# Patient Record
Sex: MALE | Race: WHITE | Employment: FULL TIME | URBAN - METROPOLITAN AREA
[De-identification: names, ages, dates, MRNs, and addresses within clinical notes are randomized per-mention and may not be internally consistent; named-entity substitution may affect disease eponyms.]

---

## 2019-08-23 ENCOUNTER — HOSPITAL ENCOUNTER (INPATIENT)
Age: 29
LOS: 1 days | Discharge: HOME | DRG: 195 | End: 2019-08-25
Attending: STUDENT IN AN ORGANIZED HEALTH CARE EDUCATION/TRAINING PROGRAM | Admitting: HOSPITALIST
Payer: COMMERCIAL

## 2019-08-23 ENCOUNTER — APPOINTMENT (OUTPATIENT)
Dept: GENERAL RADIOLOGY | Age: 29
DRG: 195 | End: 2019-08-23
Attending: STUDENT IN AN ORGANIZED HEALTH CARE EDUCATION/TRAINING PROGRAM
Payer: COMMERCIAL

## 2019-08-23 DIAGNOSIS — R09.02 HYPOXIA: ICD-10-CM

## 2019-08-23 DIAGNOSIS — J18.9 PNEUMONIA OF LEFT LOWER LOBE DUE TO INFECTIOUS ORGANISM: Primary | ICD-10-CM

## 2019-08-23 LAB
ALBUMIN SERPL-MCNC: 2.9 G/DL (ref 3.5–5)
ALBUMIN/GLOB SERPL: 0.7 {RATIO} (ref 1.1–2.2)
ALP SERPL-CCNC: 71 U/L (ref 45–117)
ALT SERPL-CCNC: 33 U/L (ref 12–78)
ANION GAP SERPL CALC-SCNC: 7 MMOL/L (ref 5–15)
APPEARANCE UR: CLEAR
AST SERPL-CCNC: 29 U/L (ref 15–37)
ATRIAL RATE: 97 BPM
BACTERIA URNS QL MICRO: NEGATIVE /HPF
BASOPHILS # BLD: 0 K/UL (ref 0–0.1)
BASOPHILS NFR BLD: 0 % (ref 0–1)
BILIRUB SERPL-MCNC: 0.4 MG/DL (ref 0.2–1)
BILIRUB UR QL: NEGATIVE
BUN SERPL-MCNC: 6 MG/DL (ref 6–20)
BUN/CREAT SERPL: 8 (ref 12–20)
CALCIUM SERPL-MCNC: 8.6 MG/DL (ref 8.5–10.1)
CALCULATED P AXIS, ECG09: 63 DEGREES
CALCULATED R AXIS, ECG10: 56 DEGREES
CALCULATED T AXIS, ECG11: 35 DEGREES
CHLORIDE SERPL-SCNC: 104 MMOL/L (ref 97–108)
CO2 SERPL-SCNC: 28 MMOL/L (ref 21–32)
COLOR UR: NORMAL
COMMENT, HOLDF: NORMAL
CREAT SERPL-MCNC: 0.72 MG/DL (ref 0.7–1.3)
DIAGNOSIS, 93000: NORMAL
DIFFERENTIAL METHOD BLD: ABNORMAL
EOSINOPHIL # BLD: 0 K/UL (ref 0–0.4)
EOSINOPHIL NFR BLD: 0 % (ref 0–7)
EPITH CASTS URNS QL MICRO: NORMAL /LPF
ERYTHROCYTE [DISTWIDTH] IN BLOOD BY AUTOMATED COUNT: 12.3 % (ref 11.5–14.5)
GLOBULIN SER CALC-MCNC: 4.3 G/DL (ref 2–4)
GLUCOSE SERPL-MCNC: 102 MG/DL (ref 65–100)
GLUCOSE UR STRIP.AUTO-MCNC: NEGATIVE MG/DL
HCT VFR BLD AUTO: 38.4 % (ref 36.6–50.3)
HGB BLD-MCNC: 12.5 G/DL (ref 12.1–17)
HGB UR QL STRIP: NEGATIVE
IMM GRANULOCYTES # BLD AUTO: 0 K/UL
IMM GRANULOCYTES NFR BLD AUTO: 0 %
KETONES UR QL STRIP.AUTO: NEGATIVE MG/DL
LACTATE BLD-SCNC: 1.34 MMOL/L (ref 0.4–2)
LEUKOCYTE ESTERASE UR QL STRIP.AUTO: NEGATIVE
LYMPHOCYTES # BLD: 1 K/UL (ref 0.8–3.5)
LYMPHOCYTES NFR BLD: 7 % (ref 12–49)
MCH RBC QN AUTO: 31.1 PG (ref 26–34)
MCHC RBC AUTO-ENTMCNC: 32.6 G/DL (ref 30–36.5)
MCV RBC AUTO: 95.5 FL (ref 80–99)
MONOCYTES # BLD: 1.1 K/UL (ref 0–1)
MONOCYTES NFR BLD: 8 % (ref 5–13)
NEUTS SEG # BLD: 12 K/UL (ref 1.8–8)
NEUTS SEG NFR BLD: 85 % (ref 32–75)
NITRITE UR QL STRIP.AUTO: NEGATIVE
NRBC # BLD: 0 K/UL (ref 0–0.01)
NRBC BLD-RTO: 0 PER 100 WBC
P-R INTERVAL, ECG05: 150 MS
PH UR STRIP: 6.5 [PH] (ref 5–8)
PLATELET # BLD AUTO: 348 K/UL (ref 150–400)
PMV BLD AUTO: 10.6 FL (ref 8.9–12.9)
POTASSIUM SERPL-SCNC: 3.5 MMOL/L (ref 3.5–5.1)
PROCALCITONIN SERPL-MCNC: 0.2 NG/ML
PROT SERPL-MCNC: 7.2 G/DL (ref 6.4–8.2)
PROT UR STRIP-MCNC: NEGATIVE MG/DL
Q-T INTERVAL, ECG07: 340 MS
QRS DURATION, ECG06: 86 MS
QTC CALCULATION (BEZET), ECG08: 431 MS
RBC # BLD AUTO: 4.02 M/UL (ref 4.1–5.7)
RBC #/AREA URNS HPF: NORMAL /HPF (ref 0–5)
RBC MORPH BLD: ABNORMAL
SAMPLES BEING HELD,HOLD: NORMAL
SODIUM SERPL-SCNC: 139 MMOL/L (ref 136–145)
SP GR UR REFRACTOMETRY: <1.005 (ref 1–1.03)
UA: UC IF INDICATED,UAUC: NORMAL
UR CULT HOLD, URHOLD: NORMAL
UROBILINOGEN UR QL STRIP.AUTO: 0.2 EU/DL (ref 0.2–1)
VENTRICULAR RATE, ECG03: 97 BPM
WBC # BLD AUTO: 14.1 K/UL (ref 4.1–11.1)
WBC URNS QL MICRO: NORMAL /HPF (ref 0–4)

## 2019-08-23 PROCEDURE — 94762 N-INVAS EAR/PLS OXIMTRY CONT: CPT

## 2019-08-23 PROCEDURE — 74011250636 HC RX REV CODE- 250/636: Performed by: FAMILY MEDICINE

## 2019-08-23 PROCEDURE — 71046 X-RAY EXAM CHEST 2 VIEWS: CPT

## 2019-08-23 PROCEDURE — 84145 PROCALCITONIN (PCT): CPT

## 2019-08-23 PROCEDURE — 94640 AIRWAY INHALATION TREATMENT: CPT

## 2019-08-23 PROCEDURE — 99218 HC RM OBSERVATION: CPT

## 2019-08-23 PROCEDURE — 81001 URINALYSIS AUTO W/SCOPE: CPT

## 2019-08-23 PROCEDURE — 36415 COLL VENOUS BLD VENIPUNCTURE: CPT

## 2019-08-23 PROCEDURE — 74011250637 HC RX REV CODE- 250/637: Performed by: FAMILY MEDICINE

## 2019-08-23 PROCEDURE — 85025 COMPLETE CBC W/AUTO DIFF WBC: CPT

## 2019-08-23 PROCEDURE — 87040 BLOOD CULTURE FOR BACTERIA: CPT

## 2019-08-23 PROCEDURE — 77030029684 HC NEB SM VOL KT MONA -A

## 2019-08-23 PROCEDURE — 74011000250 HC RX REV CODE- 250: Performed by: FAMILY MEDICINE

## 2019-08-23 PROCEDURE — 74011250636 HC RX REV CODE- 250/636: Performed by: STUDENT IN AN ORGANIZED HEALTH CARE EDUCATION/TRAINING PROGRAM

## 2019-08-23 PROCEDURE — 80053 COMPREHEN METABOLIC PANEL: CPT

## 2019-08-23 PROCEDURE — 83605 ASSAY OF LACTIC ACID: CPT

## 2019-08-23 PROCEDURE — 77010033678 HC OXYGEN DAILY

## 2019-08-23 PROCEDURE — 74011000258 HC RX REV CODE- 258: Performed by: STUDENT IN AN ORGANIZED HEALTH CARE EDUCATION/TRAINING PROGRAM

## 2019-08-23 PROCEDURE — 93005 ELECTROCARDIOGRAM TRACING: CPT

## 2019-08-23 PROCEDURE — 99285 EMERGENCY DEPT VISIT HI MDM: CPT

## 2019-08-23 PROCEDURE — 94760 N-INVAS EAR/PLS OXIMETRY 1: CPT

## 2019-08-23 RX ORDER — SODIUM CHLORIDE 0.9 % (FLUSH) 0.9 %
5-40 SYRINGE (ML) INJECTION AS NEEDED
Status: DISCONTINUED | OUTPATIENT
Start: 2019-08-23 | End: 2019-08-25 | Stop reason: HOSPADM

## 2019-08-23 RX ORDER — SODIUM CHLORIDE 9 MG/ML
75 INJECTION, SOLUTION INTRAVENOUS CONTINUOUS
Status: DISCONTINUED | OUTPATIENT
Start: 2019-08-23 | End: 2019-08-25

## 2019-08-23 RX ORDER — IPRATROPIUM BROMIDE AND ALBUTEROL SULFATE 2.5; .5 MG/3ML; MG/3ML
3 SOLUTION RESPIRATORY (INHALATION)
Status: DISCONTINUED | OUTPATIENT
Start: 2019-08-23 | End: 2019-08-24

## 2019-08-23 RX ORDER — ACETAMINOPHEN 325 MG/1
650 TABLET ORAL
Status: DISCONTINUED | OUTPATIENT
Start: 2019-08-23 | End: 2019-08-25 | Stop reason: HOSPADM

## 2019-08-23 RX ORDER — SODIUM CHLORIDE 0.9 % (FLUSH) 0.9 %
5-10 SYRINGE (ML) INJECTION AS NEEDED
Status: DISCONTINUED | OUTPATIENT
Start: 2019-08-23 | End: 2019-08-25 | Stop reason: HOSPADM

## 2019-08-23 RX ORDER — BENZONATATE 100 MG/1
100 CAPSULE ORAL
Status: DISCONTINUED | OUTPATIENT
Start: 2019-08-23 | End: 2019-08-25 | Stop reason: HOSPADM

## 2019-08-23 RX ORDER — SODIUM CHLORIDE 0.9 % (FLUSH) 0.9 %
5-40 SYRINGE (ML) INJECTION EVERY 8 HOURS
Status: DISCONTINUED | OUTPATIENT
Start: 2019-08-23 | End: 2019-08-25 | Stop reason: HOSPADM

## 2019-08-23 RX ORDER — IBUPROFEN 600 MG/1
600 TABLET ORAL
COMMUNITY
End: 2019-08-25

## 2019-08-23 RX ADMIN — IPRATROPIUM BROMIDE AND ALBUTEROL SULFATE 3 ML: .5; 3 SOLUTION RESPIRATORY (INHALATION) at 17:35

## 2019-08-23 RX ADMIN — BENZONATATE 100 MG: 100 CAPSULE ORAL at 19:22

## 2019-08-23 RX ADMIN — SODIUM CHLORIDE 1000 ML: 900 INJECTION, SOLUTION INTRAVENOUS at 15:17

## 2019-08-23 RX ADMIN — Medication 10 ML: at 22:47

## 2019-08-23 RX ADMIN — ACETAMINOPHEN 650 MG: 325 TABLET, FILM COATED ORAL at 19:24

## 2019-08-23 RX ADMIN — SODIUM CHLORIDE 1000 ML: 900 INJECTION, SOLUTION INTRAVENOUS at 14:05

## 2019-08-23 RX ADMIN — SODIUM CHLORIDE 400 ML: 900 INJECTION, SOLUTION INTRAVENOUS at 13:30

## 2019-08-23 RX ADMIN — AZITHROMYCIN MONOHYDRATE 500 MG: 500 INJECTION, POWDER, LYOPHILIZED, FOR SOLUTION INTRAVENOUS at 15:17

## 2019-08-23 RX ADMIN — CEFTRIAXONE SODIUM 2 G: 2 INJECTION, POWDER, FOR SOLUTION INTRAMUSCULAR; INTRAVENOUS at 14:38

## 2019-08-23 RX ADMIN — IPRATROPIUM BROMIDE AND ALBUTEROL SULFATE 3 ML: .5; 3 SOLUTION RESPIRATORY (INHALATION) at 19:27

## 2019-08-23 RX ADMIN — SODIUM CHLORIDE 75 ML/HR: 900 INJECTION, SOLUTION INTRAVENOUS at 17:29

## 2019-08-23 NOTE — PROGRESS NOTES
TRANSFER - IN REPORT:    Verbal report received from [facility]) on Michell Odor  being received from ED (unit) for routine progression of care      Report consisted of patients Situation, Background, Assessment and   Recommendations(SBAR). Information from the following report(s) SBAR, Kardex, ED Summary, Intake/Output, MAR and Recent Results was reviewed with the receiving nurse. Opportunity for questions and clarification was provided. Rossana May to receive care of patient on admission to unit.  The above SBAR report was reviewed with Carlita Bryant

## 2019-08-23 NOTE — LETTER
NOTIFICATION OF RETURN TO WORK / SCHOOL 
 
8/25/2019 Mr. Pojoa Metzger [address] [id number] To Whom It May Concern: 
 
Pooja Metzger was admitted to [facility] from 8/23/19 to 8/25/19. He will return to work on 8/28/19 with no restrictions. If there are questions or concerns please have the patient contact our office. Sincerely, Diane Hodges MD

## 2019-08-23 NOTE — ED PROVIDER NOTES
Previously healthy 26-year-old male presenting with fever for the past 9 days, frequent nonproductive cough. Over the past several days he is developed shortness of breath especially with exertion. Denies any recent distant travel, hemoptysis. No leg swelling. [facility], reportedly 85% on room air. Here he is satting 88-90 on room air. Improved with 2 L nasal cannula. Mild sore throat associated with frequent coughing, denies chest pain. History reviewed. No pertinent past medical history. Past Surgical History:   Procedure Laterality Date    HX HEENT      wisdom teeth extraction         History reviewed. No pertinent family history.     Social History     Socioeconomic History    Marital status: SINGLE     Spouse name: Not on file    Number of children: Not on file    Years of education: Not on file    Highest education level: Not on file   Occupational History    Not on file   Social Needs    Financial resource strain: Not on file    Food insecurity:     Worry: Not on file     Inability: Not on file    Transportation needs:     Medical: Not on file     Non-medical: Not on file   Tobacco Use    Smoking status: Never Smoker    Smokeless tobacco: Never Used   Substance and Sexual Activity    Alcohol use: Yes     Comment: socially    Drug use: Not on file    Sexual activity: Not on file   Lifestyle    Physical activity:     Days per week: Not on file     Minutes per session: Not on file    Stress: Not on file   Relationships    Social connections:     Talks on phone: Not on file     Gets together: Not on file     Attends Yazidi service: Not on file     Active member of club or organization: Not on file     Attends meetings of clubs or organizations: Not on file     Relationship status: Not on file    Intimate partner violence:     Fear of current or ex partner: Not on file     Emotionally abused: Not on file     Physically abused: Not on file     Forced sexual activity: Not on file   Other Topics Concern    Not on file   Social History Narrative    Not on file         ALLERGIES: Patient has no known allergies. Review of Systems   Constitutional: Negative for chills, fatigue and fever. HENT: Negative for ear pain, sore throat and trouble swallowing. Eyes: Negative for visual disturbance. Respiratory: Positive for cough and shortness of breath. Cardiovascular: Negative for chest pain and leg swelling. Gastrointestinal: Negative for abdominal pain. Genitourinary: Negative for dysuria. Musculoskeletal: Negative for back pain. Skin: Negative for rash. Neurological: Negative for light-headedness and headaches. Psychiatric/Behavioral: Negative for confusion. All other systems reviewed and are negative. Vitals:    08/23/19 1308 08/23/19 1318 08/23/19 1330 08/23/19 1345   BP: 154/76  145/83 142/66   Pulse:   (!) 111 (!) 103   Resp: 12  23 22   Temp: 98.9 °F (37.2 °C)      SpO2: (!) 83% 94% 97% 94%            Physical Exam   Constitutional: He appears well-developed. No distress. HENT:   Head: Normocephalic and atraumatic. Right Ear: External ear normal.   Left Ear: External ear normal.   Eyes: Pupils are equal, round, and reactive to light. EOM are normal.   Neck: Normal range of motion. Cardiovascular:   Tachycardic     Pulmonary/Chest: Effort normal. He has rales (lll). He exhibits no tenderness. Abdominal: Soft. He exhibits no distension and no mass. There is no tenderness. There is no guarding. Neurological: He is alert. He has normal strength. No sensory deficit. Skin: Capillary refill takes less than 2 seconds. Nursing note and vitals reviewed. EKG: Normal sinus rhythm rate of 97, normal axis, no ST-T wave changes. Normal intervals. QRS, QTc within normal limits.   MDM  Number of Diagnoses or Management Options  Diagnosis management comments: Patient with fever now for 9 days, initially diagnosed with viral infection, now hypoxemic. Consider pneumonia versus less likely viral pneumonitis, heart failure, pleural effusion, pulmonary embolus. Check x-ray, IV fluids, antibiotics depending on x-ray results, likely admission given hypoxemia. Hospitalist Cinthia for Admission  2:08 PM    ED Room Number: ER23/23  Patient Name and age:  Lemon Holstein 26 y.o.  male  Working Diagnosis:   1. Pneumonia of left lower lobe due to infectious organism ([facility] [address]    2.  Hypoxia      Readmission: no  Isolation Requirements:  no  Recommended Level of Care:  med/surg  Code Status:  Full Code  Department:[facility] Adult ED - 21   Other:           Procedures

## 2019-08-23 NOTE — ED TRIAGE NOTES
Pt visited [facility] today x 1 week ago, diagnosed with URI, was not given abx. Pt states he was sob x2-3 days, O2 sat 85% RA, denies CP. Pt a&ox3, pt at 94% on 4 L via NC, pt now 91-90% RA. Pt with nonproductive cough.

## 2019-08-23 NOTE — H&P
[facility]  HISTORY AND PHYSICAL    Name:  Faiza Duval  MR#:  [id number]  :  1990  ACCOUNT #:  [de-identified]  ADMIT DATE:  2019    CHIEF COMPLAINT:  Fever. HISTORY OF PRESENT ILLNESS:  The patient is a 23-year-old gentleman with no past medical history, presents to the hospital with 9 days of fever and nonproductive cough. The patient reports about 9 days ago, he started having some cough associated with shortness of breath. The patient reports he went to [facility].  He was given some ibuprofen and was told this was secondary to \"a virus. \"  The patient reports he continued to have shortness of breath, cough, and today he started having significant shortness of breath. The patient reports he went to the ER today, found to be hypoxic. His pulse ox was found to be 83% on room air and he was sent to the ER for further management and evaluation. The patient came to the ER, was found to have a pulse ox around 88% and was put on 2 L nasal cannula. He had a chest x-ray done and was found to have pneumonia and it was multilobar pneumonia and was requested to be admitted under the hospitalist service. The patient denies any headache, blurry vision, sore throat, trouble swallowing, trouble with speech, chest pain, abdominal pain, constipation, diarrhea, urinary symptoms, focal or generalized neurological weakness, recent travel, sick contacts, falls, injuries, hematemesis, melena, hemoptysis, hematuria, or any other concerns or problems. PAST MEDICAL HISTORY:  See above. HOME MEDICATIONS:  None. SOCIAL HISTORY:  Denies tobacco abuse. Drinks 6 to 8 beers over the weekend. Denies IV drug abuse. Lives at home. ALLERGIES:  NO KNOWN DRUG ALLERGIES. REVIEW OF SYMPTOMS:  All systems were reviewed and found to be essentially negative except for the symptoms mentioned above.     FAMILY HISTORY:  Discussed and was found to be noncontributory to the present admission, specifically no history of respiratory illness within the family. PHYSICAL EXAMINATION:  VITAL SIGNS:  Temperature 99.5, pulse 100, respiratory rate 18, blood pressure 137/72, pulse ox 94% on 2 L, on arrival it was 83% on room air. GENERAL:  Alert x3, awake, moderately distressed, pleasant male, appears to be stated age. HEENT:  Pupils are equal and reactive to light. Dry mucous membranes. Tympanic membranes clear. NECK:  Supple. CHEST:  Decreased basilar breath sounds with scattered crepitations. CORONARY:  S1 and S2 are heard. ABDOMEN:  Soft, nontender, and nondistended. Bowel sounds are physiological.  EXTREMITIES:  No clubbing, no cyanosis, no edema. NEURO/PSYCH:  Pleasant mood and affect. Cranial nerves II through XII are grossly intact. No focal neurological deficits. SKIN:  Warm. LABORATORY DATA:  White count 14.1, hemoglobin 12.5, hematocrit 38.4, platelets 464. Urine shows no signs of infection. Sodium 139, potassium 3.5, chloride 104, bicarbonate 28, anion gap 7, glucose 102, BUN 6, creatinine 0.72, calcium 8.6. Bilirubin total 0.4, ALT 33, AST 29, alkaline phosphatase 71. Procalcitonin is 0.2. X-ray of the chest shows bilateral multifocal pneumonia. EKG shows normal sinus rhythm. ASSESSMENT AND PLAN:  1.  Multifocal pneumonia with acute hypoxic respiratory failure. The patient will be admitted on a telemetry bed. We will provide IV hydration, IV antibiotics, DuoNebs, supportive care, close monitoring, oxygen support, pulse ox monitoring, and reassess as needed. We will continue to closely monitor. Blood cultures will be drawn. Monitor for now. 2.  Gastrointestinal/deep venous thrombosis prophylaxis. The patient will be on sequential compression devices.       Monica Watts MD [email or web address]  D:  08/23/2019 15:35  T:  08/23/2019 16:21  JOB #:  [id number]

## 2019-08-23 NOTE — PROGRESS NOTES
Admission Medication Reconciliation:    Information obtained from:  Patient  RxQuery data available¹:  NO    Comments/Recommendations: Updated PTA meds/reviewed patient's allergies. 1)  Patient states he has been taking 2-3 tabs of ibuprofen 600mg per day to relieve his symptoms. Patient denies taking any antibiotics. 2)  Medication changes (since last review): Added  - Ibuprofen 600mg 1tab po 4 times daily as needed     ¹RxQuery pharmacy benefit data reflects medications filled and processed through the patient's insurance, however   this data does NOT capture whether the medication was picked up or is currently being taken by the patient. Allergies:  Patient has no known allergies. Significant PMH/Disease States: History reviewed. No pertinent past medical history. Chief Complaint for this Admission:  No chief complaint on file. Prior to Admission Medications:   Prior to Admission Medications   Prescriptions Last Dose Informant Patient Reported? Taking?   ibuprofen (MOTRIN) 600 mg tablet   Yes Yes   Sig: Take 600 mg by mouth every six (6) hours as needed for Pain.       Facility-Administered Medications: None

## 2019-08-23 NOTE — ROUTINE PROCESS
TRANSFER - OUT REPORT:    Verbal report given to Pamela Deleon RN(name) on [employer]  being transferred to [facility](unit) for routine progression of care       Report consisted of patients Situation, Background, Assessment and   Recommendations(SBAR). Information from the following report(s) SBAR was reviewed with the receiving nurse. Lines:   Peripheral IV 08/23/19 Right Antecubital (Active)   Site Assessment Clean, dry, & intact 8/23/2019  1:00 PM   Phlebitis Assessment 0 8/23/2019  1:00 PM   Infiltration Assessment 0 8/23/2019  1:00 PM   Dressing Status Clean, dry, & intact 8/23/2019  1:00 PM       Peripheral IV 08/23/19 Right Wrist (Active)   Site Assessment Clean, dry, & intact 8/23/2019  1:18 PM   Phlebitis Assessment 0 8/23/2019  1:18 PM   Infiltration Assessment 0 8/23/2019  1:18 PM   Dressing Status Clean, dry, & intact 8/23/2019  1:18 PM   Dressing Type Transparent 8/23/2019  1:18 PM   Alcohol Cap Used No 8/23/2019  1:18 PM        Opportunity for questions and clarification was provided.

## 2019-08-24 LAB
ANION GAP SERPL CALC-SCNC: 8 MMOL/L (ref 5–15)
BASOPHILS # BLD: 0 K/UL (ref 0–0.1)
BASOPHILS NFR BLD: 0 % (ref 0–1)
BUN SERPL-MCNC: 4 MG/DL (ref 6–20)
BUN/CREAT SERPL: 6 (ref 12–20)
CALCIUM SERPL-MCNC: 8 MG/DL (ref 8.5–10.1)
CHLORIDE SERPL-SCNC: 106 MMOL/L (ref 97–108)
CO2 SERPL-SCNC: 25 MMOL/L (ref 21–32)
CREAT SERPL-MCNC: 0.7 MG/DL (ref 0.7–1.3)
DIFFERENTIAL METHOD BLD: ABNORMAL
EOSINOPHIL # BLD: 0.1 K/UL (ref 0–0.4)
EOSINOPHIL NFR BLD: 1 % (ref 0–7)
ERYTHROCYTE [DISTWIDTH] IN BLOOD BY AUTOMATED COUNT: 12.6 % (ref 11.5–14.5)
GLUCOSE SERPL-MCNC: 110 MG/DL (ref 65–100)
HCT VFR BLD AUTO: 34.3 % (ref 36.6–50.3)
HGB BLD-MCNC: 11.5 G/DL (ref 12.1–17)
IMM GRANULOCYTES # BLD AUTO: 0.2 K/UL (ref 0–0.04)
IMM GRANULOCYTES NFR BLD AUTO: 1 % (ref 0–0.5)
LYMPHOCYTES # BLD: 1.1 K/UL (ref 0.8–3.5)
LYMPHOCYTES NFR BLD: 8 % (ref 12–49)
MCH RBC QN AUTO: 31.9 PG (ref 26–34)
MCHC RBC AUTO-ENTMCNC: 33.5 G/DL (ref 30–36.5)
MCV RBC AUTO: 95.3 FL (ref 80–99)
MONOCYTES # BLD: 1.4 K/UL (ref 0–1)
MONOCYTES NFR BLD: 10 % (ref 5–13)
NEUTS SEG # BLD: 11.2 K/UL (ref 1.8–8)
NEUTS SEG NFR BLD: 80 % (ref 32–75)
NRBC # BLD: 0 K/UL (ref 0–0.01)
NRBC BLD-RTO: 0 PER 100 WBC
PLATELET # BLD AUTO: 389 K/UL (ref 150–400)
PMV BLD AUTO: 10.1 FL (ref 8.9–12.9)
POTASSIUM SERPL-SCNC: 3.6 MMOL/L (ref 3.5–5.1)
RBC # BLD AUTO: 3.6 M/UL (ref 4.1–5.7)
SODIUM SERPL-SCNC: 139 MMOL/L (ref 136–145)
WBC # BLD AUTO: 14 K/UL (ref 4.1–11.1)

## 2019-08-24 PROCEDURE — 85025 COMPLETE CBC W/AUTO DIFF WBC: CPT

## 2019-08-24 PROCEDURE — 87449 NOS EACH ORGANISM AG IA: CPT

## 2019-08-24 PROCEDURE — 87070 CULTURE OTHR SPECIMN AEROBIC: CPT

## 2019-08-24 PROCEDURE — 36415 COLL VENOUS BLD VENIPUNCTURE: CPT

## 2019-08-24 PROCEDURE — 80048 BASIC METABOLIC PNL TOTAL CA: CPT

## 2019-08-24 PROCEDURE — 74011000258 HC RX REV CODE- 258: Performed by: FAMILY MEDICINE

## 2019-08-24 PROCEDURE — 74011250636 HC RX REV CODE- 250/636: Performed by: FAMILY MEDICINE

## 2019-08-24 PROCEDURE — 65660000000 HC RM CCU STEPDOWN

## 2019-08-24 PROCEDURE — 99218 HC RM OBSERVATION: CPT

## 2019-08-24 PROCEDURE — 74011250637 HC RX REV CODE- 250/637: Performed by: FAMILY MEDICINE

## 2019-08-24 RX ORDER — IPRATROPIUM BROMIDE AND ALBUTEROL SULFATE 2.5; .5 MG/3ML; MG/3ML
3 SOLUTION RESPIRATORY (INHALATION)
Status: DISCONTINUED | OUTPATIENT
Start: 2019-08-24 | End: 2019-08-25 | Stop reason: HOSPADM

## 2019-08-24 RX ADMIN — BENZONATATE 100 MG: 100 CAPSULE ORAL at 04:35

## 2019-08-24 RX ADMIN — Medication 10 ML: at 21:57

## 2019-08-24 RX ADMIN — ACETAMINOPHEN 650 MG: 325 TABLET, FILM COATED ORAL at 04:35

## 2019-08-24 RX ADMIN — ACETAMINOPHEN 650 MG: 325 TABLET, FILM COATED ORAL at 21:55

## 2019-08-24 RX ADMIN — BENZONATATE 100 MG: 100 CAPSULE ORAL at 21:55

## 2019-08-24 RX ADMIN — BENZONATATE 100 MG: 100 CAPSULE ORAL at 11:30

## 2019-08-24 RX ADMIN — CEFTRIAXONE SODIUM 1 G: 1 INJECTION, POWDER, FOR SOLUTION INTRAMUSCULAR; INTRAVENOUS at 14:06

## 2019-08-24 RX ADMIN — SODIUM CHLORIDE 75 ML/HR: 900 INJECTION, SOLUTION INTRAVENOUS at 22:50

## 2019-08-24 RX ADMIN — AZITHROMYCIN MONOHYDRATE 500 MG: 500 INJECTION, POWDER, LYOPHILIZED, FOR SOLUTION INTRAVENOUS at 15:48

## 2019-08-24 RX ADMIN — SODIUM CHLORIDE 75 ML/HR: 900 INJECTION, SOLUTION INTRAVENOUS at 10:31

## 2019-08-24 NOTE — ROUTINE PROCESS
Bedside shift change report given to Marlys Li RN (oncoming nurse) by Orestes Russo RN (offgoing nurse). Report included the following information SBAR, Kardex, Intake/Output, MAR and Cardiac Rhythm Sinus Tach and NSR.

## 2019-08-24 NOTE — PHYSICIAN ADVISORY
Letter of Status Determination:   Recommend hospitalization status upgraded from   OBSERVATION  to INPATIENT  Status     Pt Name:  Pooja Metzger   MR#   [address] # [id number] /  [id number]  Payor: Falko Toribio / Plan: Ian Kotyk / Product Type: HMO /    CSN#  [id number]   [address] [place] [address]  359/01  @ [facility]   Hospitalization date  8/23/2019 12:54 PM   Current Attending Physician  Blu Mendez MD   Principal diagnosis  Pneumonia [J18.9]     Clinicals  26 y.o. y.o  male hospitalized with above diagnosis  Multilobar pneumonia with hypoxia      [place] (MCG) criteria   Does  apply Pneumonia with hypoxia, bliateral pneumonai  Wbc 14   STATUS DETERMINATION  Inpatient       Additional comments     Payor: Flako Toribio / Plan: Ian Kotyk / Product Type: HMO /           Karena Echols MD

## 2019-08-24 NOTE — PROGRESS NOTES
Hospitalist Progress Note  Charity Skaggs MD  Answering service: [phone] from in house phone  Cell:       Date of Service:  2019  NAME:  Abena Samuel  :  1990  MRN:  [id number]      Admission Summary:   25-year-old gentleman with no past medical history, presents to the hospital with 9 days of fever and nonproductive cough. The patient reports about 9 days ago, he started having some cough associated with shortness of breath. The patient reports he went to [facility].  He was given some ibuprofen and was told this was secondary to \"a virus. \"  The patient reports he continued to have shortness of breath, cough, and today he started having significant shortness of breath. The patient reports he went to the ER today, found to be hypoxic. His pulse ox was found to be 83% on room air and he was sent to the ER for further management and evaluation. The patient came to the ER, was found to have a pulse ox around 88% and was put on 2 L nasal cannula    Interval history / Subjective:     Feels some what better. Assessment & Plan:     1. Multilobar pneumonia: Check Legionella and strep pneumo agen. On Rocephin and Zithromax. Sputum ordered. Blood cultures negative so far. 2. Hypoxia related to Multiolobar pneumonia. Code status: Full  DVT prophylaxis: Lovenox    Care Plan discussed with: Patient/Family  Disposition: Home w/Family and TBD     Hospital Problems  Never Reviewed          Codes Class Noted POA    Pneumonia ICD-10-CM: J18.9  ICD-9-CM: 862  2019 Unknown                Review of Systems:   A comprehensive review of systems was negative except for that written in the HPI. Vital Signs:    Last 24hrs VS reviewed since prior progress note.  Most recent are:  Visit Vitals  /80 (BP 1 Location: Left arm, BP Patient Position: At rest)   Pulse 94   Temp 97.6 °F (36.4 °C)   Resp 20   Wt 80 kg (176 lb 5.9 oz) SpO2 94%         Intake/Output Summary (Last 24 hours) at 8/24/2019 0926  Last data filed at 8/24/2019 2153  Gross per 24 hour   Intake 1285 ml   Output --   Net 1285 ml        Physical Examination:             Constitutional:  No acute distress, cooperative, pleasant    ENT:  Oral mucous moist, oropharynx benign. Neck supple,    Resp:  CTA bilaterally. No wheezing/rhonchi/rales. No accessory muscle use   CV:  Regular rhythm, normal rate, no murmurs, gallops, rubs    GI:  Soft, non distended, non tender. normoactive bowel sounds, no hepatosplenomegaly     Musculoskeletal:  No edema, warm, 2+ pulses throughout    Neurologic:  Moves all extremities. AAOx3, CN II-XII reviewed     Skin:  Good turgor, no rashes or ulcers       Data Review:    Review and/or order of clinical lab test      Labs:     Recent Labs     08/24/19  0358 08/23/19  1319   WBC 14.0* 14.1*   HGB 11.5* 12.5   HCT 34.3* 38.4    348     Recent Labs     08/24/19  0358 08/23/19  1319    139   K 3.6 3.5    104   CO2 25 28   BUN 4* 6   CREA 0.70 0.72   * 102*   CA 8.0* 8.6     Recent Labs     08/23/19  1319   SGOT 29   ALT 33   AP 71   TBILI 0.4   TP 7.2   ALB 2.9*   GLOB 4.3*     No results for input(s): INR, PTP, APTT in the last 72 hours. No lab exists for component: INREXT   No results for input(s): FE, TIBC, PSAT, FERR in the last 72 hours. No results found for: FOL, RBCF   No results for input(s): PH, PCO2, PO2 in the last 72 hours. No results for input(s): CPK, CKNDX, TROIQ in the last 72 hours.     No lab exists for component: CPKMB  No results found for: CHOL, CHOLX, CHLST, CHOLV, HDL, LDL, LDLC, DLDLP, TGLX, TRIGL, TRIGP, CHHD, CHHDX  No results found for: [facility]  Lab Results   Component Value Date/Time    Color YELLOW/STRAW 08/23/2019 01:43 PM    Appearance CLEAR 08/23/2019 01:43 PM    Specific gravity <1.005 08/23/2019 01:43 PM    pH (UA) 6.5 08/23/2019 01:43 PM    Protein NEGATIVE  08/23/2019 01:43 PM Glucose NEGATIVE  08/23/2019 01:43 PM    Ketone NEGATIVE  08/23/2019 01:43 PM    Bilirubin NEGATIVE  08/23/2019 01:43 PM    Urobilinogen 0.2 08/23/2019 01:43 PM    Nitrites NEGATIVE  08/23/2019 01:43 PM    Leukocyte Esterase NEGATIVE  08/23/2019 01:43 PM    Epithelial cells FEW 08/23/2019 01:43 PM    Bacteria NEGATIVE  08/23/2019 01:43 PM    WBC 0-4 08/23/2019 01:43 PM    RBC 0-5 08/23/2019 01:43 PM         Medications Reviewed:     Current Facility-Administered Medications   Medication Dose Route Frequency    sodium chloride (NS) flush 5-10 mL  5-10 mL IntraVENous PRN    sodium chloride (NS) flush 5-40 mL  5-40 mL IntraVENous Q8H    sodium chloride (NS) flush 5-40 mL  5-40 mL IntraVENous PRN    0.9% sodium chloride infusion  75 mL/hr IntraVENous CONTINUOUS    cefTRIAXone (ROCEPHIN) 1 g in 0.9% sodium chloride (MBP/ADV) 50 mL  1 g IntraVENous Q24H    azithromycin (ZITHROMAX) 500 mg in 0.9% sodium chloride (MBP/ADV) 250 mL  500 mg IntraVENous Q24H    acetaminophen (TYLENOL) tablet 650 mg  650 mg Oral Q4H PRN    albuterol-ipratropium (DUO-NEB) 2.5 MG-0.5 MG/3 ML  3 mL Nebulization Q4H RT    benzonatate (TESSALON) capsule 100 mg  100 mg Oral TID PRN     ______________________________________________________________________  EXPECTED LENGTH OF STAY: - - -  ACTUAL LENGTH OF STAY:          0                 Yvonne Kirk MD

## 2019-08-25 VITALS
SYSTOLIC BLOOD PRESSURE: 134 MMHG | WEIGHT: 174.16 LBS | DIASTOLIC BLOOD PRESSURE: 79 MMHG | HEART RATE: 77 BPM | RESPIRATION RATE: 20 BRPM | TEMPERATURE: 97.9 F | OXYGEN SATURATION: 96 %

## 2019-08-25 LAB
ANION GAP SERPL CALC-SCNC: 6 MMOL/L (ref 5–15)
BASOPHILS # BLD: 0 K/UL (ref 0–0.1)
BASOPHILS NFR BLD: 0 % (ref 0–1)
BLASTS NFR BLD MANUAL: 0 %
BUN SERPL-MCNC: 5 MG/DL (ref 6–20)
BUN/CREAT SERPL: 9 (ref 12–20)
CALCIUM SERPL-MCNC: 8.6 MG/DL (ref 8.5–10.1)
CHLORIDE SERPL-SCNC: 108 MMOL/L (ref 97–108)
CO2 SERPL-SCNC: 26 MMOL/L (ref 21–32)
CREAT SERPL-MCNC: 0.58 MG/DL (ref 0.7–1.3)
DIFFERENTIAL METHOD BLD: ABNORMAL
EOSINOPHIL # BLD: 0.3 K/UL (ref 0–0.4)
EOSINOPHIL NFR BLD: 3 % (ref 0–7)
ERYTHROCYTE [DISTWIDTH] IN BLOOD BY AUTOMATED COUNT: 13 % (ref 11.5–14.5)
GLUCOSE SERPL-MCNC: 87 MG/DL (ref 65–100)
HCT VFR BLD AUTO: 34.4 % (ref 36.6–50.3)
HGB BLD-MCNC: 11.9 G/DL (ref 12.1–17)
IMM GRANULOCYTES # BLD AUTO: 0 K/UL
IMM GRANULOCYTES NFR BLD AUTO: 0 %
L PNEUMO1 AG UR QL IA: NEGATIVE
LYMPHOCYTES # BLD: 1 K/UL (ref 0.8–3.5)
LYMPHOCYTES NFR BLD: 10 % (ref 12–49)
MCH RBC QN AUTO: 34.5 PG (ref 26–34)
MCHC RBC AUTO-ENTMCNC: 34.6 G/DL (ref 30–36.5)
MCV RBC AUTO: 99.7 FL (ref 80–99)
METAMYELOCYTES NFR BLD MANUAL: 0 %
MONOCYTES # BLD: 1.1 K/UL (ref 0–1)
MONOCYTES NFR BLD: 11 % (ref 5–13)
MYELOCYTES NFR BLD MANUAL: 0 %
NEUTS BAND NFR BLD MANUAL: 1 % (ref 0–6)
NEUTS SEG # BLD: 7.8 K/UL (ref 1.8–8)
NEUTS SEG NFR BLD: 75 % (ref 32–75)
NRBC # BLD: 0 K/UL (ref 0–0.01)
NRBC BLD-RTO: 0 PER 100 WBC
OTHER CELLS NFR BLD MANUAL: 0 %
PLATELET # BLD AUTO: 416 K/UL (ref 150–400)
PMV BLD AUTO: 10 FL (ref 8.9–12.9)
POTASSIUM SERPL-SCNC: 3.9 MMOL/L (ref 3.5–5.1)
PROMYELOCYTES NFR BLD MANUAL: 0 %
RBC # BLD AUTO: 3.45 M/UL (ref 4.1–5.7)
RBC MORPH BLD: ABNORMAL
SODIUM SERPL-SCNC: 140 MMOL/L (ref 136–145)
SPECIMEN SOURCE: NORMAL
WBC # BLD AUTO: 10.2 K/UL (ref 4.1–11.1)

## 2019-08-25 PROCEDURE — 36415 COLL VENOUS BLD VENIPUNCTURE: CPT

## 2019-08-25 PROCEDURE — 85027 COMPLETE CBC AUTOMATED: CPT

## 2019-08-25 PROCEDURE — 80048 BASIC METABOLIC PNL TOTAL CA: CPT

## 2019-08-25 PROCEDURE — 94760 N-INVAS EAR/PLS OXIMETRY 1: CPT

## 2019-08-25 PROCEDURE — 74011250637 HC RX REV CODE- 250/637: Performed by: FAMILY MEDICINE

## 2019-08-25 PROCEDURE — 77010033678 HC OXYGEN DAILY

## 2019-08-25 RX ORDER — CEFDINIR 300 MG/1
300 CAPSULE ORAL 2 TIMES DAILY
Qty: 8 CAP | Refills: 0 | Status: SHIPPED | OUTPATIENT
Start: 2019-08-25 | End: 2021-05-19

## 2019-08-25 RX ADMIN — BENZONATATE 100 MG: 100 CAPSULE ORAL at 05:30

## 2019-08-25 RX ADMIN — ACETAMINOPHEN 650 MG: 325 TABLET, FILM COATED ORAL at 05:30

## 2019-08-25 RX ADMIN — Medication 10 ML: at 05:30

## 2019-08-25 NOTE — ROUTINE PROCESS
Bedside shift change report given to Sonia Strickland RN (oncoming nurse) by Jose D Fontenot (offgoing nurse). Report included the following information SBAR, Kardex, Intake/Output, MAR and Cardiac Rhythm NSR.

## 2019-08-25 NOTE — DISCHARGE SUMMARY
Physician Discharge Summary     Patient ID:    Roxanne Flores  [id number]  8 y.o.  1990    Admit date: 8/23/2019    Discharge date and time: 8/25/2019    Hospital Diagnoses and Treatment Rendered:     79-year-old gentleman with no past medical history, presents to the hospital with 9 days of fever and nonproductive cough.  The patient reports about 9 days ago, he started having some cough associated with shortness of breath.  The patient reports [facility] was given some ibuprofen and was told this was secondary to \"a virus. \"  The patient reports he continued to have shortness of breath, cough, and today he started having significant shortness of breath.  The patient reports he went to the ER today, found to be hypoxic.  His pulse ox was found to be 83% on room air and he was sent to the ER for further management and evaluation.  The patient came to the ER, was found to have a pulse ox around 88% and was put on 2 L nasal cannula    Multilobar pneumonia: Treated with Rocephin and Zithromax. Blood cultures were negative. Improved subjectively  Recommended  Complete 7 days antibacterial course with Omnicef. Completed azithromycin  Hypoxia related to Multiolobar pneumonia. Resolved. Was on RA on discharge    Chronic Diagnoses:    Problem List as of 8/25/2019 Never Reviewed          Codes Class Noted - Resolved    Pneumonia ICD-10-CM: J18.9  ICD-9-CM: 007  8/23/2019 - Present              Discharge Medications:   Current Discharge Medication List      START taking these medications    Details   cefdinir (OMNICEF) 300 mg capsule Take 1 Cap by mouth two (2) times a day. Qty: 8 Cap, Refills: 0         STOP taking these medications       ibuprofen (MOTRIN) 600 mg tablet Comments:   Reason for Stopping:             Physical Exam   Constitutional: He is oriented to person, place, and time. He appears well-developed and well-nourished. HENT:   Head: Normocephalic and atraumatic.    Neck: Neck supple. No JVD present. Cardiovascular: Normal rate and regular rhythm. No murmur heard. Pulmonary/Chest: No stridor. No respiratory distress. He has no wheezes. He has no rales. Abdominal: Soft. Bowel sounds are normal. He exhibits no distension. Lymphadenopathy:     He has no cervical adenopathy. Neurological: He is alert and oriented to person, place, and time. No cranial nerve deficit. Skin: Skin is warm and dry. No rash noted. He is not diaphoretic. Follow up Care:    1. None in 1-2 weeks. Please call to set up an appointment shortly after discharge. Diet:  Regular Diet    Disposition:  Home. Advanced Directive:   FULL    DNR      Discharge Exam:  See today's note. CONSULTATIONS: None    Significant Diagnostic Studies:   8/23/2019: BUN 6 MG/DL (Ref range: 6 - 20 MG/DL); Calcium 8.6 MG/DL (Ref range: 8.5 - 10.1 MG/DL); CO2 28 mmol/L (Ref range: 21 - 32 mmol/L); Creatinine 0.72 MG/DL (Ref range: 0.70 - 1.30 MG/DL); Glucose 102 mg/dL* (Ref range: 65 - 100 mg/dL); HCT 38.4 % (Ref range: 36.6 - 50.3 %); HGB 12.5 g/dL (Ref range: 12.1 - 17.0 g/dL); Potassium 3.5 mmol/L (Ref range: 3.5 - 5.1 mmol/L); Sodium 139 mmol/L (Ref range: 136 - 145 mmol/L)  8/24/2019: BUN 4 MG/DL* (Ref range: 6 - 20 MG/DL); Calcium 8.0 MG/DL* (Ref range: 8.5 - 10.1 MG/DL); CO2 25 mmol/L (Ref range: 21 - 32 mmol/L); Creatinine 0.70 MG/DL (Ref range: 0.70 - 1.30 MG/DL); Glucose 110 mg/dL* (Ref range: 65 - 100 mg/dL); HCT 34.3 %* (Ref range: 36.6 - 50.3 %); HGB 11.5 g/dL* (Ref range: 12.1 - 17.0 g/dL); Potassium 3.6 mmol/L (Ref range: 3.5 - 5.1 mmol/L);  Sodium 139 mmol/L (Ref range: 136 - 145 mmol/L)  Recent Labs     08/25/19  0403 08/24/19  0358   WBC 10.2 14.0*   HGB 11.9* 11.5*   HCT 34.4* 34.3*   * 389     Recent Labs     08/25/19  0403 08/24/19  0358 08/23/19  1319    139 139   K 3.9 3.6 3.5    106 104   CO2 26 25 28   BUN 5* 4* 6   CREA 0.58* 0.70 0.72   GLU 87 110* 102*   CA 8.6 8.0* 8.6 Recent Labs     08/23/19  1319   SGOT 29   ALT 33   AP 71   TBILI 0.4   TP 7.2   ALB 2.9*   GLOB 4.3*     No results for input(s): INR, PTP, APTT in the last 72 hours. No lab exists for component: INREXT   No results for input(s): FE, TIBC, PSAT, FERR in the last 72 hours. No results for input(s): PH, PCO2, PO2 in the last 72 hours. No results for input(s): CPK, CKMB in the last 72 hours.     No lab exists for component: TROPONINI  No results found for: GLUCPOC    CDMP Checked:   Yes x       Signed:  Vickki Koyanagi, MD  8/25/2019  9:38 AM

## 2019-08-26 LAB
BACTERIA SPEC CULT: NORMAL
GRAM STN SPEC: NORMAL
SERVICE CMNT-IMP: NORMAL

## 2019-08-28 LAB
BACTERIA SPEC CULT: NORMAL
SERVICE CMNT-IMP: NORMAL